# Patient Record
Sex: MALE | Race: WHITE | NOT HISPANIC OR LATINO | URBAN - NONMETROPOLITAN AREA
[De-identification: names, ages, dates, MRNs, and addresses within clinical notes are randomized per-mention and may not be internally consistent; named-entity substitution may affect disease eponyms.]

---

## 2018-08-18 ENCOUNTER — OFFICE VISIT (OUTPATIENT)
Dept: FAMILY MEDICINE | Facility: OTHER | Age: 33
End: 2018-08-18
Attending: FAMILY MEDICINE
Payer: COMMERCIAL

## 2018-08-18 VITALS
SYSTOLIC BLOOD PRESSURE: 138 MMHG | DIASTOLIC BLOOD PRESSURE: 80 MMHG | WEIGHT: 219.2 LBS | BODY MASS INDEX: 31.38 KG/M2 | TEMPERATURE: 98.1 F | HEART RATE: 80 BPM | HEIGHT: 70 IN

## 2018-08-18 DIAGNOSIS — S05.02XA ABRASION OF LEFT CORNEA, INITIAL ENCOUNTER: Primary | ICD-10-CM

## 2018-08-18 PROCEDURE — 99203 OFFICE O/P NEW LOW 30 MIN: CPT | Performed by: FAMILY MEDICINE

## 2018-08-18 RX ORDER — ERYTHROMYCIN 5 MG/G
0.5 OINTMENT OPHTHALMIC 4 TIMES DAILY
Qty: 1 TUBE | Refills: 0 | Status: SHIPPED | OUTPATIENT
Start: 2018-08-18

## 2018-08-18 ASSESSMENT — PAIN SCALES - GENERAL: PAINLEVEL: MILD PAIN (3)

## 2018-08-18 ASSESSMENT — ENCOUNTER SYMPTOMS: EYE PAIN: 1

## 2018-08-18 NOTE — NURSING NOTE
"Patient presents with sore left eye. Patient was golfing and got pine needle into eye yesterday. Patient spoke with optometrist and was advised to get on antibiotics. Kateryna Flores LPN .............8/18/2018  2:54 PM  Chief Complaint   Patient presents with     Eye Problem       Initial /80 (BP Location: Left arm, Patient Position: Sitting, Cuff Size: Adult Regular)  Pulse 80  Temp 98.1  F (36.7  C) (Tympanic)  Ht 5' 10.08\" (1.78 m)  Wt 219 lb 3.2 oz (99.4 kg)  BMI 31.38 kg/m2 Estimated body mass index is 31.38 kg/(m^2) as calculated from the following:    Height as of this encounter: 5' 10.08\" (1.78 m).    Weight as of this encounter: 219 lb 3.2 oz (99.4 kg).  Medication Reconciliation: complete    Kateryna Flores LPN   Visual Acuity Screening   Visual acuity OD (right eye): 20/ 32   Visual acuity OS (left eye): 20/ 100   Visual acuity OU (both eyes): 20/ 25   Corrective lenses worn: yes  Kateryna Flores LPN .............8/18/2018  2:59 PM             "

## 2018-08-18 NOTE — MR AVS SNAPSHOT
"              After Visit Summary   2018    Marino Bass    MRN: [id number]           Patient Information     Date Of Birth          1985        Visit Information        Provider Department      2018 2:15 PM Westley Mantilla MD [facility]        Today's Diagnoses     Abrasion of left cornea, initial encounter    -  1       Follow-ups after your visit        Who to contact     If you have questions or need follow up information about today's clinic visit or your schedule please contact [facility] directly at [phone].  Normal or non-critical lab and imaging results will be communicated to you by Zachary Prellhart, letter or phone within 4 business days after the clinic has received the results. If you do not hear from us within 7 days, please contact the clinic through [employer] or phone. If you have a critical or abnormal lab result, we will notify you by phone as soon as possible.  Submit refill requests through [employer] or call your pharmacy and they will forward the refill request to us. Please allow 3 business days for your refill to be completed.          Additional Information About Your Visit        MyChart Information     [employer] lets you send messages to your doctor, view your test results, renew your prescriptions, schedule appointments and more. To sign up, go to [email or web address] . Click on \"Log in\" on the left side of the screen, which will take you to the Welcome page. Then click on \"Sign up Now\" on the right side of the page.     You will be asked to enter the access code listed below, as well as some personal information. Please follow the directions to create your username and password.     Your access code is: [email or web address]  Expires: 2018  2:56 PM     Your access code will  in 90 days. If you need help or a new code, please call your [place] clinic or [phone].        Care EveryWhere ID     This is your Care EveryWhere ID. " "This could be used by other organizations to access your [place] medical records  [id number]        Your Vitals Were     Pulse Temperature Height BMI (Body Mass Index)          80 98.1  F (36.7  C) (Tympanic) 5' 10.08\" (1.78 m) 31.38 kg/m2         Blood Pressure from Last 3 Encounters:   08/18/18 138/80    Weight from Last 3 Encounters:   08/18/18 219 lb 3.2 oz (99.4 kg)              Today, you had the following     No orders found for display         Today's Medication Changes          These changes are accurate as of 8/18/18  4:00 PM.  If you have any questions, ask your nurse or doctor.               Start taking these medicines.        Dose/Directions    erythromycin ophthalmic ointment   Commonly known as:  ROMYCIN   Used for:  Abrasion of left cornea, initial encounter   Started by:  Westley Mantilla MD        Dose:  0.5 inch   Place 0.5 inches Into the left eye 4 times daily   Quantity:  1 Tube   Refills:  0            Where to get your medicines      These medications were sent to [id number] - [place], [place] - [address]  [address], [facility] [address]     Phone:  [phone]     erythromycin ophthalmic ointment                Primary Care Provider Fax #    Physician No Ref-Primary [phone]       No address on file        Equal Access to Services     TC DUFF AH: Hadii jim crockero Soalejandrina, waaxda luqadaha, qaybta kaalmada adeegyada, augustine lópez . So [facility] [phone].    ATENCIÓN: Si habla español, tiene a root disposición servicios gratuitos de asistencia lingüística. Llame al [phone].    We comply with applicable federal civil rights laws and [place] laws. We do not discriminate on the basis of race, color, national origin, age, disability, sex, sexual orientation, or gender identity.            Thank you!     Thank you for choosing [facility] [place] [facility] AND [facility]  for your care. Our goal is always to " provide you with excellent care. Hearing back from our patients is one way we can continue to improve our services. Please take a few minutes to complete the written survey that you may receive in the mail after your visit with us. Thank you!             Your Updated Medication List - Protect others around you: Learn how to safely use, store and throw away your medicines at www.disposemymeds.org.          This list is accurate as of 8/18/18  4:00 PM.  Always use your most recent med list.                   Brand Name Dispense Instructions for use Diagnosis    erythromycin ophthalmic ointment    ROMYCIN    1 Tube    Place 0.5 inches Into the left eye 4 times daily    Abrasion of left cornea, initial encounter

## 2018-08-18 NOTE — PROGRESS NOTES
"  SUBJECTIVE:   Marino Bass is a 33 year old male who presents to clinic today for the following health issues: Left eye pain    HPI Comments: Patient arrives here for left eye pain.  He was recently outside when a needle on an [place] brushed against his eye.  This occurred a couple days ago    Eye Problem            There are no active problems to display for this patient.    History reviewed. No pertinent past medical history.   History reviewed. No pertinent surgical history.  Social History     Social History Narrative     No narrative on file     Current Outpatient Prescriptions   Medication Sig Dispense Refill     erythromycin (ROMYCIN) ophthalmic ointment Place 0.5 inches Into the left eye 4 times daily 1 Tube 0     Allergies   Allergen Reactions     Penicillin G Rash       Review of Systems   Eyes: Positive for pain.        OBJECTIVE:     /80 (BP Location: Left arm, Patient Position: Sitting, Cuff Size: Adult Regular)  Pulse 80  Temp 98.1  F (36.7  C) (Tympanic)  Ht 5' 10.08\" (1.78 m)  Wt 219 lb 3.2 oz (99.4 kg)  BMI 31.38 kg/m2  Body mass index is 31.38 kg/(m^2).  Physical Exam   Constitutional: He appears well-developed.   HENT:   Head: Normocephalic.   On gross examination cornea was unremarkable.  Pupils reactive no photophobia.  Fluorescein was applied and there is a very superficial abrasion at about 3:00.       none     ASSESSMENT/PLAN:           ICD-10-CM    1. Abrasion of left cornea, initial encounter S05.02XA erythromycin (ROMYCIN) ophthalmic ointment   Patient has an optometrist friend and recommended Floxin eyedrops.  I did bring up the cost.  Also advised him I typically use erythromycin prophylactically.  He wishes to proceed with erythromycin for now.  IfSymptoms persist towards next week follow-up with optometry      Westley Mantlila MD  [facility] AND [facility]  "

## 2018-08-18 NOTE — NURSING NOTE
fluorscein ordered by Westley Mantilla MD.  Medication administered per order in [employer]   Lot # [id number]  Exp. 07/2020  NDC 954312959174  Patient tolerated well.    Sodium chloride ordered by Westley Mantilla MD.  Medication administered per order in [employer]   Lot # [id number]  Exp. Dec 2019  NDC 7733-5868-24  Patient tolerated well.  Kateryna Flores LPN..................8/18/2018  3:43 PM

## (undated) RX ORDER — TETRACAINE HYDROCHLORIDE 5 MG/ML
SOLUTION OPHTHALMIC
Status: DISPENSED
Start: 2018-08-18

## (undated) RX ORDER — SODIUM CHLORIDE FOR INHALATION 0.9 %
VIAL, NEBULIZER (ML) INHALATION
Status: DISPENSED
Start: 2018-08-18